# Patient Record
Sex: FEMALE | Race: WHITE | NOT HISPANIC OR LATINO | URBAN - METROPOLITAN AREA
[De-identification: names, ages, dates, MRNs, and addresses within clinical notes are randomized per-mention and may not be internally consistent; named-entity substitution may affect disease eponyms.]

---

## 2020-09-11 ENCOUNTER — EMERGENCY (EMERGENCY)
Facility: HOSPITAL | Age: 60
LOS: 1 days | Discharge: HOME | End: 2020-09-11
Attending: EMERGENCY MEDICINE | Admitting: EMERGENCY MEDICINE
Payer: OTHER MISCELLANEOUS

## 2020-09-11 VITALS
DIASTOLIC BLOOD PRESSURE: 83 MMHG | HEART RATE: 82 BPM | SYSTOLIC BLOOD PRESSURE: 131 MMHG | OXYGEN SATURATION: 98 % | RESPIRATION RATE: 18 BRPM | WEIGHT: 117.07 LBS | HEIGHT: 64 IN | TEMPERATURE: 98 F

## 2020-09-11 PROCEDURE — 99283 EMERGENCY DEPT VISIT LOW MDM: CPT
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
## 2020-09-11 NOTE — ED PROVIDER NOTE - CARE PLAN
Principal Discharge DX:	Neck strain, initial encounter  Secondary Diagnosis:	Back strain, initial encounter

## 2020-09-11 NOTE — ED ADULT NURSE NOTE - CHPI ED NUR SYMPTOMS NEG
no decreased eating/drinking/no nausea/no tingling/no chills/no weakness/no vomiting/no dizziness/no fever

## 2020-09-11 NOTE — ED PROVIDER NOTE - NSFOLLOWUPINSTRUCTIONS_ED_ALL_ED_FT
Cervical Strain    WHAT YOU NEED TO KNOW:    A cervical strain is a stretched or torn muscle or tendon in your neck. Tendons are strong tissues that connect muscles to bones.    DISCHARGE INSTRUCTIONS:    Return to the emergency department if:     You have pain or numbness from your shoulder down to your hand.      You have problems with your vision, hearing, or balance.      You feel confused or cannot concentrate.      You have problems with movement and strength.    Call your doctor if:     You have increased swelling or pain in your neck.       You have questions or concerns about your condition or care.    Medicines: You may need any of the following:     Acetaminophen decreases pain and fever. It is available without a doctor's order. Ask how much to take and how often to take it. Follow directions. Read the labels of all other medicines you are using to see if they also contain acetaminophen, or ask your doctor or pharmacist. Acetaminophen can cause liver damage if not taken correctly. Do not use more than 4 grams (4,000 milligrams) total of acetaminophen in one day.       NSAIDs, such as ibuprofen, help decrease swelling, pain, and fever. This medicine is available with or without a doctor's order. NSAIDs can cause stomach bleeding or kidney problems in certain people. If you take blood thinner medicine, always ask your healthcare provider if NSAIDs are safe for you. Always read the medicine label and follow directions.      Muscle relaxers help decrease pain and muscle spasms.      Prescription pain medicine may be given. Ask your healthcare provider how to take this medicine safely. Some prescription pain medicines contain acetaminophen. Do not take other medicines that contain acetaminophen without talking to your healthcare provider. Too much acetaminophen may cause liver damage. Prescription pain medicine may cause constipation. Ask your healthcare provider how to prevent or treat constipation.       Take your medicine as directed. Contact your healthcare provider if you think your medicine is not helping or if you have side effects. Tell him or her if you are allergic to any medicine. Keep a list of the medicines, vitamins, and herbs you take. Include the amounts, and when and why you take them. Bring the list or the pill bottles to follow-up visits. Carry your medicine list with you in case of an emergency.    Manage your symptoms:     Apply heat on your neck for 15 to 20 minutes, 4 to 6 times a day or as directed. Heat helps decrease pain, stiffness, and muscle spasms.      Begin gentle neck exercises as soon as you can move your neck without pain. Exercises will help decrease stiffness and improve the strength and movement of your neck. Ask your healthcare provider what kind of exercises you should do.      Gradually return to your usual activities as directed. Stop if you have pain. Avoid activities that can cause more damage to your neck, such as heavy lifting or strenuous exercise.      Sleep without a pillow to help decrease pain. Instead, roll a small towel tightly and place it under your neck.       Go to physical therapy as directed. A physical therapist teaches you exercises to help improve movement and strength, and to decrease pain.     Prevent another neck injury:     Drive safely. Make sure everyone in your car wears a seatbelt. A seatbelt can save your life if you are in an accident. Do not use your cell phone when you are driving. This could distract you and cause an accident. Pull over if you need to make a call or send a text message.       Wear helmets, lifejackets, and protective gear. Always wear a helmet when you ride a bike or motorcycle, go skiing, or play sports that could cause a head injury. Wear protective equipment when you play sports. Wear a lifejacket when you are on a boat or doing water sports.     Follow up with your doctor as directed: You may be referred to an orthopedist or physical therapies. Write down your questions so you remember to ask them during your visits.        © Copyright [employer] 2020       back to top                      © Copyright [employer] 2020       Thoracic Back Strain    WHAT YOU NEED TO KNOW:    A thoracic back strain is a muscle or tendon injury in your upper or middle back. You may have pain, muscle spasms, swelling, or stiffness. A mild strain may cause minor pain that goes away in a few days. A more severe strain may cause the muscle or tendon to tear. There is a very small chance you may need surgery to fix the tear.    DISCHARGE INSTRUCTIONS:    Call your local emergency number (911 in the [place]) for any of the following:     You have chest pain or shortness of breath.        Return to the emergency department if:     You have severe pain, or pain that spreads from your back to other areas.      You have new or increased swelling or redness in the injured area.    Call your doctor if:     You have questions or concerns about your condition or care.        Medicines: You may need any of the following:     Prescription pain medicine may be given. Ask your healthcare provider how to take this medicine safely. Some prescription pain medicines contain acetaminophen. Do not take other medicines that contain acetaminophen without talking to your healthcare provider. Too much acetaminophen may cause liver damage. Prescription pain medicine may cause constipation. Ask your healthcare provider how to prevent or treat constipation.       NSAIDs, such as ibuprofen, help decrease swelling, pain, and fever. This medicine is available with or without a doctor's order. NSAIDs can cause stomach bleeding or kidney problems in certain people. If you take blood thinner medicine, always ask your healthcare provider if NSAIDs are safe for you. Always read the medicine label and follow directions.      Muscle relaxers help prevent or treat spasms.      Take your medicine as directed. Contact your healthcare provider if you think your medicine is not helping or if you have side effects. Tell him or her if you are allergic to any medicine. Keep a list of the medicines, vitamins, and herbs you take. Include the amounts, and when and why you take them. Bring the list or the pill bottles to follow-up visits. Carry your medicine list with you in case of an emergency.    Self-care:     Rest as directed. Move slowly and carefully. Do not lift heavy objects.      Apply ice or heat as directed. Ice decreases pain and swelling and may help decrease tissue damage. Heat helps decrease muscle spasms. Your healthcare provider may tell you to apply only ice for the first 24 hours to help reduce swelling. Apply ice or heat to the area for 20 minutes every hour, or as directed. Ask how many times to do this each day, and for how many days.      Use an elastic wrap or back brace as directed. These will help keep the injured area from moving so it can heal.      Go to physical therapy as directed. A physical therapist can teach you exercises to help strengthen your back. He or she can also teach you safe ways to bend and move so you do not cause more injury.    Prevent another thoracic back strain:     Lift objects carefully. Ask someone to help you lift heavy objects. If you must lift an object by yourself, do not use your back muscles to lift. Lift with your legs.      Check your posture. Keep your upper body lifted and your head up. Poor posture can cause back strain or make it worse. Adjust your position if you work in front of a computer. You may need arm or wrist supports or change the height of your chair.      Exercise as directed. Exercise can help strengthen your muscles and make you more flexible. Do not exercise or play sports when you are tired. Always warm up before you start and cool down when you finish.      Stretch your muscles as directed. Keep your muscles limber by stretching every day. Stretch before you exercise.    Follow up with your doctor as directed: You may need more tests to check for other injuries or to see how your injury is healing. You may also need to see a specialist. Write down your questions so you remember to ask them during your visits.       © Copyright [employer] 2020       back to top                      © [employer] 2020       Motor Vehicle Accident    WHAT YOU NEED TO KNOW:    A motor vehicle accident (MVA) can cause injury from the impact or from being thrown around inside the car. You may have a bruise on your abdomen, chest, or neck from the seatbelt. You may also have pain in your face, neck, or back. You may have pain in your knee, hip, or thigh if your body hits the dash or the steering wheel. Muscle pain is commonly worse 1 to 2 days after an MVA.    DISCHARGE INSTRUCTIONS:    Call your local emergency number (911 in the ) if:     You have new or worsening chest pain or shortness of breath.        Call your doctor if:     You have new or worsening pain in your abdomen.      You have nausea and vomiting that does not get better.      You have a severe headache.      You have weakness, tingling, or numbness in your arms or legs.      You have new or worsening pain that makes it hard for you to move.      You have pain that develops 2 to 3 days after the MVA.      You have questions or concerns about your condition or care.    Medicines:     Pain medicine: You may be given medicine to take away or decrease pain. Do not wait until the pain is severe before you take your medicine.      NSAIDs, such as ibuprofen, help decrease swelling, pain, and fever. This medicine is available with or without a doctor's order. NSAIDs can cause stomach bleeding or kidney problems in certain people. If you take blood thinner medicine, always ask if NSAIDs are safe for you. Always read the medicine label and follow directions. Do not give these medicines to children under 6 months of age without direction from your child's healthcare provider.      Take your medicine as directed. Contact your healthcare provider if you think your medicine is not helping or if you have side effects. Tell him of her if you are allergic to any medicine. Keep a list of the medicines, vitamins, and herbs you take. Include the amounts, and when and why you take them. Bring the list or the pill bottles to follow-up visits. Carry your medicine list with you in case of an emergency.    Self-care:     Use ice and heat. Ice helps decrease swelling and pain. Ice may also help prevent tissue damage. Use an ice pack, or put crushed ice in a plastic bag. Cover it with a towel and apply to your injured area for 15 to 20 minutes every hour, or as directed. After 2 days, use a heating pad on your injured area. Use heat as directed.       Gently stretch. Use gentle exercises to stretch your muscles after an MVA. Ask your healthcare provider for exercises you can do.     Safety tips: The following can help prevent another MVA or lower your risk for injury:     Always wear your seatbelt. This will help reduce serious injury from an MVA. The seatbelt should have one strap that goes across your chest and another that goes across your lap.      Always put your child in a child safety seat. Use a safety seat made for his or her age, height, and weight. Choose a safety seat that has a harness and clip. Place the safety seat in the middle of the car's back seat. The safety seat should not move more than 1 inch in any direction after you secure it. Always follow the instructions provided for your safety seat to help you position it. The instructions will also guide you on how to secure your child properly. Ask your healthcare provider for more information about child safety seats. Child Safety Seat           Decrease speed. Drive the speed limit to reduce your risk for an MVA.      Do not drive if you are tired. You will react more slowly when you are tired. The slowed reaction time will increase your risk for an MVA.      Do not talk or text on your cell phone while you drive. You cannot respond fast enough in an emergency if you are distracted by texts or conversations.      Do not use drugs or drink alcohol before you drive. You may be more tired or take risks that you normally would not take. Do not drive after you take medicine that makes you sleepy. Use a designated  or arrange for a ride home.      Help your teenager become a safe . Be a good role model with your own driving. Talk to your teen about ways to lower the risk for an MVA. These include not driving when tired and not having distractions, such as a phone. Remind your teen to always go the speed limit and to wear a seatbelt.    Follow up with your healthcare provider as directed: Write down your questions so you remember to ask them during your visits.        © Copyright [employer] 2020       back to top                      © Copyright [employer] 2020

## 2020-09-11 NOTE — ED PROVIDER NOTE - PATIENT PORTAL LINK FT
You can access the FollowMyHealth Patient Portal offered by [facility] by registering at the following website: [email or web address]. By joining [employer]’s FollowMyHealth portal, you will also be able to view your health information using other applications (apps) compatible with our system.
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
## 2020-09-11 NOTE — ED PROVIDER NOTE - ENMT, MLM
Airway patent, Nasal mucosa clear. Mouth with normal mucosa. Throat has no vesicles, no oropharyngeal exudates and uvula is midline. Normocephalic. mild tenderness to back of head, no hematoma, deformity, or crepitus
